# Patient Record
Sex: FEMALE | Race: WHITE
[De-identification: names, ages, dates, MRNs, and addresses within clinical notes are randomized per-mention and may not be internally consistent; named-entity substitution may affect disease eponyms.]

---

## 2021-11-20 ENCOUNTER — HOSPITAL ENCOUNTER (EMERGENCY)
Dept: HOSPITAL 4 - SED | Age: 41
Discharge: HOME | End: 2021-11-20
Payer: MEDICAID

## 2021-11-20 VITALS — BODY MASS INDEX: 23.03 KG/M2 | WEIGHT: 122 LBS | HEIGHT: 61 IN

## 2021-11-20 VITALS — SYSTOLIC BLOOD PRESSURE: 120 MMHG

## 2021-11-20 VITALS — SYSTOLIC BLOOD PRESSURE: 116 MMHG

## 2021-11-20 DIAGNOSIS — Z79.899: ICD-10-CM
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
## 2021-11-20 DIAGNOSIS — K59.00: ICD-10-CM
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
## 2021-11-20 DIAGNOSIS — S39.011A: Primary | ICD-10-CM

## 2021-11-20 PROCEDURE — 96372 THER/PROPH/DIAG INJ SC/IM: CPT

## 2021-11-20 PROCEDURE — 74018 RADEX ABDOMEN 1 VIEW: CPT

## 2021-11-20 PROCEDURE — 99285 EMERGENCY DEPT VISIT HI MDM: CPT

## 2021-11-20 NOTE — NUR
Patient given written and verbal discharge instructions and verbalizes 
understanding.  ER Dr. cook discussed with patient the results and treatment 
provided. Patient in stable condition. ID arm band removed. 

Rx of Tylenol and Motrin given. Patient educated on pain management and to 
follow up with PMD. Pain Scale 1.

Opportunity for questions provided and answered. Medication side effect fact 
sheet provided.

## 2021-11-20 NOTE — NUR
pt. bib  with c/o pain to right side of abd. that radiates to midline 
rates it 8/10, pain started last night denies N/V/D